# Patient Record
Sex: FEMALE | Race: WHITE | Employment: FULL TIME | URBAN - METROPOLITAN AREA
[De-identification: names, ages, dates, MRNs, and addresses within clinical notes are randomized per-mention and may not be internally consistent; named-entity substitution may affect disease eponyms.]

---

## 2021-06-03 ENCOUNTER — TRANSCRIBE ORDER (OUTPATIENT)
Dept: INTERVENTIONAL RADIOLOGY/VASCULAR | Age: 31
End: 2021-06-03

## 2021-06-03 DIAGNOSIS — D49.9 NEOPLASM: Primary | ICD-10-CM

## 2021-06-10 ENCOUNTER — TRANSCRIBE ORDER (OUTPATIENT)
Dept: INTERVENTIONAL RADIOLOGY/VASCULAR | Age: 31
End: 2021-06-10

## 2021-06-10 NOTE — PROGRESS NOTES
Pt aware to arrive to radiology check in for care unit on Wed Jun 16 @ 0700 for 0830 procedure, pt verbalizes understanding of NPO after MN and needing  home. Pt denies taking antiplatelet/anticoagulant agents.

## 2021-06-16 ENCOUNTER — HOSPITAL ENCOUNTER (OUTPATIENT)
Dept: CT IMAGING | Age: 31
Discharge: HOME | End: 2021-06-16
Attending: RADIOLOGY | Admitting: UROLOGY
Payer: OTHER GOVERNMENT

## 2021-06-16 VITALS
TEMPERATURE: 98 F | BODY MASS INDEX: 24.33 KG/M2 | WEIGHT: 151.4 LBS | OXYGEN SATURATION: 100 % | RESPIRATION RATE: 16 BRPM | HEIGHT: 66 IN | DIASTOLIC BLOOD PRESSURE: 66 MMHG | HEART RATE: 66 BPM | SYSTOLIC BLOOD PRESSURE: 99 MMHG

## 2021-06-16 DIAGNOSIS — D49.9 NEOPLASM: ICD-10-CM

## 2021-06-16 LAB
ANION GAP SERPL CALC-SCNC: 6 MMOL/L (ref 3–18)
APTT PPP: 26.3 SEC (ref 23–36.4)
BUN SERPL-MCNC: 16 MG/DL (ref 7–18)
BUN/CREAT SERPL: 17 (ref 12–20)
CALCIUM SERPL-MCNC: 9 MG/DL (ref 8.5–10.1)
CHLORIDE SERPL-SCNC: 110 MMOL/L (ref 100–111)
CO2 SERPL-SCNC: 26 MMOL/L (ref 21–32)
CREAT SERPL-MCNC: 0.94 MG/DL (ref 0.6–1.3)
ERYTHROCYTE [DISTWIDTH] IN BLOOD BY AUTOMATED COUNT: 11.8 % (ref 11.6–14.5)
GLUCOSE SERPL-MCNC: 93 MG/DL (ref 74–99)
HCG UR QL: NEGATIVE
HCT VFR BLD AUTO: 38.4 % (ref 35–45)
HGB BLD-MCNC: 12.8 G/DL (ref 12–16)
INR PPP: 1.1 (ref 0.8–1.2)
MCH RBC QN AUTO: 29.6 PG (ref 24–34)
MCHC RBC AUTO-ENTMCNC: 33.3 G/DL (ref 31–37)
MCV RBC AUTO: 88.9 FL (ref 74–97)
PLATELET # BLD AUTO: 262 K/UL (ref 135–420)
PMV BLD AUTO: 10 FL (ref 9.2–11.8)
POTASSIUM SERPL-SCNC: 4.1 MMOL/L (ref 3.5–5.5)
PROTHROMBIN TIME: 13.7 SEC (ref 11.5–15.2)
RBC # BLD AUTO: 4.32 M/UL (ref 4.2–5.3)
SODIUM SERPL-SCNC: 142 MMOL/L (ref 136–145)
WBC # BLD AUTO: 4.6 K/UL (ref 4.6–13.2)

## 2021-06-16 PROCEDURE — 80048 BASIC METABOLIC PNL TOTAL CA: CPT

## 2021-06-16 PROCEDURE — 88305 TISSUE EXAM BY PATHOLOGIST: CPT

## 2021-06-16 PROCEDURE — 88333 PATH CONSLTJ SURG CYTO XM 1: CPT

## 2021-06-16 PROCEDURE — 77030022559 CT BX RENAL RT

## 2021-06-16 PROCEDURE — 74011000250 HC RX REV CODE- 250

## 2021-06-16 PROCEDURE — 36415 COLL VENOUS BLD VENIPUNCTURE: CPT

## 2021-06-16 PROCEDURE — 85730 THROMBOPLASTIN TIME PARTIAL: CPT

## 2021-06-16 PROCEDURE — 85610 PROTHROMBIN TIME: CPT

## 2021-06-16 PROCEDURE — 99152 MOD SED SAME PHYS/QHP 5/>YRS: CPT

## 2021-06-16 PROCEDURE — 74011250636 HC RX REV CODE- 250/636: Performed by: RADIOLOGY

## 2021-06-16 PROCEDURE — 85027 COMPLETE CBC AUTOMATED: CPT

## 2021-06-16 PROCEDURE — 81025 URINE PREGNANCY TEST: CPT

## 2021-06-16 RX ORDER — LIDOCAINE HYDROCHLORIDE 10 MG/ML
INJECTION INFILTRATION; PERINEURAL
Status: COMPLETED
Start: 2021-06-16 | End: 2021-06-16

## 2021-06-16 RX ORDER — LIDOCAINE HYDROCHLORIDE 10 MG/ML
1-20 INJECTION INFILTRATION; PERINEURAL
Status: COMPLETED | OUTPATIENT
Start: 2021-06-16 | End: 2021-06-16

## 2021-06-16 RX ORDER — MIDAZOLAM HYDROCHLORIDE 1 MG/ML
.5-4 INJECTION, SOLUTION INTRAMUSCULAR; INTRAVENOUS
Status: DISCONTINUED | OUTPATIENT
Start: 2021-06-16 | End: 2021-06-16 | Stop reason: HOSPADM

## 2021-06-16 RX ORDER — LABETALOL HCL 20 MG/4 ML
5-20 SYRINGE (ML) INTRAVENOUS
Status: DISCONTINUED | OUTPATIENT
Start: 2021-06-16 | End: 2021-06-16 | Stop reason: HOSPADM

## 2021-06-16 RX ORDER — FENTANYL CITRATE 50 UG/ML
25-200 INJECTION, SOLUTION INTRAMUSCULAR; INTRAVENOUS
Status: DISCONTINUED | OUTPATIENT
Start: 2021-06-16 | End: 2021-06-16 | Stop reason: HOSPADM

## 2021-06-16 RX ORDER — HYDRALAZINE HYDROCHLORIDE 20 MG/ML
10-20 INJECTION INTRAMUSCULAR; INTRAVENOUS
Status: DISCONTINUED | OUTPATIENT
Start: 2021-06-16 | End: 2021-06-16 | Stop reason: HOSPADM

## 2021-06-16 RX ORDER — CITALOPRAM 40 MG/1
40 TABLET, FILM COATED ORAL EVERY EVENING
COMMUNITY

## 2021-06-16 RX ORDER — SODIUM CHLORIDE 9 MG/ML
20 INJECTION, SOLUTION INTRAVENOUS CONTINUOUS
Status: DISCONTINUED | OUTPATIENT
Start: 2021-06-16 | End: 2021-06-16 | Stop reason: HOSPADM

## 2021-06-16 RX ORDER — ALBUTEROL SULFATE 90 UG/1
2 AEROSOL, METERED RESPIRATORY (INHALATION)
COMMUNITY

## 2021-06-16 RX ADMIN — SODIUM CHLORIDE 20 ML/HR: 900 INJECTION, SOLUTION INTRAVENOUS at 07:33

## 2021-06-16 RX ADMIN — LIDOCAINE HYDROCHLORIDE 4 ML: 10 INJECTION INFILTRATION; PERINEURAL at 09:05

## 2021-06-16 RX ADMIN — LIDOCAINE HYDROCHLORIDE 4 ML: 10 INJECTION, SOLUTION INFILTRATION; PERINEURAL at 09:05

## 2021-06-16 RX ADMIN — FENTANYL CITRATE 50 MCG: 50 INJECTION, SOLUTION INTRAMUSCULAR; INTRAVENOUS at 09:00

## 2021-06-16 RX ADMIN — MIDAZOLAM HYDROCHLORIDE 1 MG: 1 INJECTION, SOLUTION INTRAMUSCULAR; INTRAVENOUS at 09:00

## 2021-06-16 NOTE — PROGRESS NOTES
Patient identity confirmed using verbal statement and confirmation of armband with name and  identifiers. Confirmed patient has been NPO since midnight, takes no anticoagulants, has had no issues with anesthesia/sedation medications. Pt wishes to proceed with scheduled CT guided right renal mass biopsy. Patient brought to the ct room by this RN and LULA Wyatt RN. Assisted into correct position as confirmed by Sunita Hernandez CT tech. Consent obtained by Dr. Ronald Buchanan and witnessed by this RN. Vitals monitor initiated with EKG monitoring. 2L of O2 via nasal cannula placed on standby, currently on room air. Patient resting comfortably. Will continue to monitor.   Philip Gay RN

## 2021-06-16 NOTE — PROCEDURES
Vascular & Interventional Radiology Brief Procedure Note    Interventional Radiologist: Xochilt Holliday MD    Pre-operative Diagnosis:  R renal mass bx    Post-operative Diagnosis: Same as pre-op dx    Procedure(s) Performed:  same    Anesthesia:  Local and Moderate Sedation    Findings:  Uncomplicated R renal mass bx    Complications: None    Estimated Blood Loss:  minimal    Tubes and Drains: None    Specimens: 6, 18g cores.      Condition: Good       Xochilt Holliday MD  [facility]  Vascular & Interventional Radiology  6/16/2021

## 2021-06-16 NOTE — PROGRESS NOTES
Back from procedure. Band-aid intact to right flank. No bleeding or swelling. C/o of mild soreness at site. Diet given.  at bedside. 1000 Tolerated diet well.  at bedside. 1200 Dr. Rory La called about pt needing to use the restroom. Ok to get up, pt assisted up to bathroom voided, clear yellow urine. Back to bed.   1205 pt currently breast feeding, Dr. Rory La aware, instructed to pump & dump for 24 hrs. Also, pt asked about running. Dr. Rory La instructed her to wait for 48 hrs.   1300 Discharged home via w/c  in care of  in stable condition. Pain 3/10 right flank, soreness.

## 2021-06-16 NOTE — PROGRESS NOTES
TRANSFER - OUT REPORT:    Verbal report given to Armand Vaughan RN (name) on Isaura Ashraf  being transferred to Care Unit (unit) for routine progression of care       Report consisted of patients Situation, Background, Assessment and   Recommendations(SBAR). Information from the following report(s) SBAR, Procedure Summary, MAR and Cardiac Rhythm NSR was reviewed with the receiving nurse. Lines:   Peripheral IV 06/16/21 Anterior;Distal;Right Wrist (Active)   Site Assessment Clean, dry, & intact 06/16/21 0732   Phlebitis Assessment 0 06/16/21 0732   Infiltration Assessment 0 06/16/21 0732   Dressing Status Clean, dry, & intact 06/16/21 0732   Dressing Type Tape;Transparent 06/16/21 0732   Hub Color/Line Status Flushed;Blue 06/16/21 0732   Action Taken Open ports on tubing capped 06/16/21 0732   Alcohol Cap Used Yes 06/16/21 0732        Opportunity for questions and clarification was provided.       Patient transported with:   Registered Nurse     Bandaid to right flank    Imani Szymanski RN

## 2021-06-16 NOTE — DISCHARGE INSTRUCTIONS
Patient Education     DISCHARGE SUMMARY from Nurse    PATIENT INSTRUCTIONS:    After general anesthesia or intravenous sedation, for 24 hours or while taking prescription Narcotics:  · Limit your activities  · Do not drive and operate hazardous machinery  · Do not make important personal or business decisions  · Do  not drink alcoholic beverages  · If you have not urinated within 8 hours after discharge, please contact your surgeon on call. Report the following to your surgeon:  · Excessive pain, swelling, redness or odor of or around the surgical area  · Temperature over 100.5  · Nausea and vomiting lasting longer than 4 hours or if unable to take medications  · Any signs of decreased circulation or nerve impairment to extremity: change in color, persistent  numbness, tingling, coldness or increase pain  · Any questions    What to do at Home:  Recommended activity:     If you experience any of the following symptoms   , please follow up with MD.    *  Please give a list of your current medications to your Primary Care Provider. *  Please update this list whenever your medications are discontinued, doses are      changed, or new medications (including over-the-counter products) are added. *  Please carry medication information at all times in case of emergency situations. These are general instructions for a healthy lifestyle:    No smoking/ No tobacco products/ Avoid exposure to second hand smoke  Surgeon General's Warning:  Quitting smoking now greatly reduces serious risk to your health.     Obesity, smoking, and sedentary lifestyle greatly increases your risk for illness    A healthy diet, regular physical exercise & weight monitoring are important for maintaining a healthy lifestyle    You may be retaining fluid if you have a history of heart failure or if you experience any of the following symptoms:  Weight gain of 3 pounds or more overnight or 5 pounds in a week, increased swelling in our hands or feet or shortness of breath while lying flat in bed. Please call your doctor as soon as you notice any of these symptoms; do not wait until your next office visit. The discharge information has been reviewed with the patient and spouse. The patient and spouse verbalized understanding. Discharge medications reviewed with the patient and spouse and appropriate educational materials and side effects teaching were provided. ___________________________________________________________________________________________________________________________________     Needle Biopsy of the Kidney: What to Expect at Home  Your Recovery     A kidney biopsy is a test to take a sample (biopsy) of kidney. The doctor puts a long needle through your back (flank) into the kidney. Another doctor will look at the kidney tissue with a microscope to check for problems. After the test, you will be told to lie down on your back for several hours. After this, you should avoid strenuous activity for the next 2 to 3 days. It's normal to feel some soreness in the area of the biopsy for 2 to 3 days. You may have a small amount of bleeding on the bandage after the test. You may notice some blood in your urine after the test. This should go away within 12 to 24 hours. If it doesn't, call your doctor. This care sheet gives you a general idea about how long it will take for you to recover. But each person recovers at a different pace. Follow the steps below to feel better as quickly as possible. How can you care for yourself at home? Activity    · Avoid lifting anything that would make you strain. This may include heavy grocery bags and milk containers, a heavy briefcase or backpack, cat litter or dog food bags, a vacuum , or a child.     · Avoid strenuous activities, such as bicycle riding, jogging, weight lifting, or aerobic exercise, until your doctor says it is okay.     · Try to walk each day.  Start by walking a little more than you did the day before. Bit by bit, increase the amount you walk. Walking boosts blood flow and helps prevent pneumonia and constipation.     · Rest when you feel tired. Getting enough sleep will help you recover.     · Ask your doctor when it is okay for you to have sex. Diet    · You can eat your normal diet. If your stomach is upset, try bland, low-fat foods like plain rice, broiled chicken, toast, and yogurt.     · Drink plenty of fluids to avoid becoming dehydrated. Medicines    · Your doctor will tell you if and when you can restart your medicines. He or she will also give you instructions about taking any new medicines.     · If you take aspirin or some other blood thinner, ask your doctor if and when to start taking it again. Make sure that you understand exactly what your doctor wants you to do.     · Do not take aspirin or anti-inflammatory medicines for a week after the biopsy. Incision care    · Keep a bandage over the puncture site for the first 1 or 2 days. Follow-up care is a key part of your treatment and safety. Be sure to make and go to all appointments, and call your doctor if you are having problems. It's also a good idea to know your test results and keep a list of the medicines you take. When should you call for help? Call 911 anytime you think you may need emergency care. For example, call if:    · You passed out (lost consciousness). Call your doctor now or seek immediate medical care if:    · You have signs of infection, such as:  ? Increased pain, swelling, warmth, or redness. ? Red streaks leading from the puncture site. ? Pus draining from the puncture site. ? A fever.     · Bright red blood has soaked through the bandage over the puncture site.     · You have new or worse pain at the puncture site. Watch closely for any changes in your health, and call your doctor if:    · You have blood in your urine for more than 1 day. Where can you learn more?   Go to http://www.gray.[employer]/  Enter [id number] in the search box to learn more about \"Needle Biopsy of the Kidney: What to Expect at Home. \"  Current as of: December 17, 2020               Content Version: 12.8  © 4932-2215 Healthwise, Incorporated. Care instructions adapted under license by [employer] (which disclaims liability or warranty for this information). If you have questions about a medical condition or this instruction, always ask your healthcare professional. [place] [address] any warranty or liability for your use of this information.   Patient armband removed and shredded

## 2021-06-16 NOTE — H&P
The patient is an appropriate candidate to undergo R renal mass biopsy. Patient assessed immediately prior to induction. Anesthesia plan as follows: Moderate Sedation. Planned agent(s):  fentanyl and versed    ASA Score:  ASA 2 - Mild systemic disease    History and Physical update:  H&P was reviewed and the patient was examined. No changes have occurred in the patient's condition since the H&P was completed.     Sav Hubbard MD  Vascular & Interventional Radiology  [facility]  6/16/2021

## 2021-07-06 ENCOUNTER — ANESTHESIA EVENT (OUTPATIENT)
Dept: SURGERY | Age: 31
DRG: 658 | End: 2021-07-06
Payer: OTHER GOVERNMENT

## 2021-07-07 ENCOUNTER — ANESTHESIA (OUTPATIENT)
Dept: SURGERY | Age: 31
DRG: 658 | End: 2021-07-07
Payer: OTHER GOVERNMENT

## 2021-07-07 ENCOUNTER — HOSPITAL ENCOUNTER (INPATIENT)
Age: 31
LOS: 1 days | Discharge: HOME | DRG: 658 | End: 2021-07-08
Attending: UROLOGY | Admitting: UROLOGY
Payer: OTHER GOVERNMENT

## 2021-07-07 DIAGNOSIS — N28.89 RENAL MASS, RIGHT: Primary | ICD-10-CM

## 2021-07-07 LAB
ABO + RH BLD: NORMAL
BLOOD GROUP ANTIBODIES SERPL: NORMAL
HCG UR QL: NEGATIVE
SPECIMEN EXP DATE BLD: NORMAL

## 2021-07-07 PROCEDURE — 74011000250 HC RX REV CODE- 250: Performed by: UROLOGY

## 2021-07-07 PROCEDURE — 2709999900 HC NON-CHARGEABLE SUPPLY: Performed by: UROLOGY

## 2021-07-07 PROCEDURE — 76010000132 HC OR TIME 2.5 TO 3 HR: Performed by: UROLOGY

## 2021-07-07 PROCEDURE — 77030016151 HC PROTCTR LNS DFOG COVD -B: Performed by: UROLOGY

## 2021-07-07 PROCEDURE — 77030002933 HC SUT MCRYL J&J -A: Performed by: UROLOGY

## 2021-07-07 PROCEDURE — 77030002966 HC SUT PDS J&J -A: Performed by: UROLOGY

## 2021-07-07 PROCEDURE — 74011250636 HC RX REV CODE- 250/636: Performed by: UROLOGY

## 2021-07-07 PROCEDURE — 76060000036 HC ANESTHESIA 2.5 TO 3 HR: Performed by: UROLOGY

## 2021-07-07 PROCEDURE — 74011250636 HC RX REV CODE- 250/636: Performed by: ANESTHESIOLOGY

## 2021-07-07 PROCEDURE — 77030008477 HC STYL SATN SLP COVD -A: Performed by: ANESTHESIOLOGY

## 2021-07-07 PROCEDURE — 77030009403 HC ELECTRD ENDO MEGA -B: Performed by: UROLOGY

## 2021-07-07 PROCEDURE — 77030008683 HC TU ET CUF COVD -A: Performed by: ANESTHESIOLOGY

## 2021-07-07 PROCEDURE — 77030018390 HC SPNG HEMSTAT2 J&J -B: Performed by: UROLOGY

## 2021-07-07 PROCEDURE — 77030010935 HC CLP LIG ABSRB TELE -B: Performed by: UROLOGY

## 2021-07-07 PROCEDURE — 81025 URINE PREGNANCY TEST: CPT

## 2021-07-07 PROCEDURE — 77030008574 HC TBNG SUC IRR STRY -B: Performed by: UROLOGY

## 2021-07-07 PROCEDURE — 77030022473 HC HNDL ENDO GIA UNIV USDA -C: Performed by: UROLOGY

## 2021-07-07 PROCEDURE — 77030005515 HC CATH URETH FOL14 BARD -B: Performed by: UROLOGY

## 2021-07-07 PROCEDURE — 77030040361 HC SLV COMPR DVT MDII -B: Performed by: UROLOGY

## 2021-07-07 PROCEDURE — 77030027138 HC INCENT SPIROMETER -A

## 2021-07-07 PROCEDURE — 77030034154 HC SHR COAG HARM ACE J&J -F: Performed by: UROLOGY

## 2021-07-07 PROCEDURE — 74011000258 HC RX REV CODE- 258: Performed by: UROLOGY

## 2021-07-07 PROCEDURE — 77030008462 HC STPLR SKN PROX J&J -A: Performed by: UROLOGY

## 2021-07-07 PROCEDURE — 76210000016 HC OR PH I REC 1 TO 1.5 HR: Performed by: UROLOGY

## 2021-07-07 PROCEDURE — 77030010507 HC ADH SKN DERMBND J&J -B: Performed by: UROLOGY

## 2021-07-07 PROCEDURE — 86901 BLOOD TYPING SEROLOGIC RH(D): CPT

## 2021-07-07 PROCEDURE — C9290 INJ, BUPIVACAINE LIPOSOME: HCPCS | Performed by: UROLOGY

## 2021-07-07 PROCEDURE — 74011250637 HC RX REV CODE- 250/637: Performed by: UROLOGY

## 2021-07-07 PROCEDURE — 77030008603 HC TRCR ENDOSC EPATH J&J -C: Performed by: UROLOGY

## 2021-07-07 PROCEDURE — 65270000029 HC RM PRIVATE

## 2021-07-07 PROCEDURE — 36415 COLL VENOUS BLD VENIPUNCTURE: CPT

## 2021-07-07 PROCEDURE — 74011000250 HC RX REV CODE- 250: Performed by: ANESTHESIOLOGY

## 2021-07-07 PROCEDURE — 77030013079 HC BLNKT BAIR HGGR 3M -A: Performed by: ANESTHESIOLOGY

## 2021-07-07 PROCEDURE — 77030008516 HC TBNG INSUF ENDO S&N -B: Performed by: UROLOGY

## 2021-07-07 PROCEDURE — 88307 TISSUE EXAM BY PATHOLOGIST: CPT

## 2021-07-07 PROCEDURE — 77030009527 HC GEL PRT SYS AMR -E: Performed by: UROLOGY

## 2021-07-07 PROCEDURE — 77030020407 HC IV BLD WRMR ST 3M -A: Performed by: ANESTHESIOLOGY

## 2021-07-07 PROCEDURE — 0TT00ZZ RESECTION OF RIGHT KIDNEY, OPEN APPROACH: ICD-10-PCS | Performed by: UROLOGY

## 2021-07-07 PROCEDURE — 77030022474 HC RELD STPLR ENDO GIA COVD -C: Performed by: UROLOGY

## 2021-07-07 PROCEDURE — 77030020782 HC GWN BAIR PAWS FLX 3M -B: Performed by: UROLOGY

## 2021-07-07 PROCEDURE — 77030006643: Performed by: ANESTHESIOLOGY

## 2021-07-07 PROCEDURE — 77030010031 HC SCIS ENDOSC MPLR J&J -C: Performed by: UROLOGY

## 2021-07-07 DEVICE — CLIP LIG ABSRB HEM-LOK LG PUR --: Type: IMPLANTABLE DEVICE | Site: ABDOMEN | Status: FUNCTIONAL

## 2021-07-07 RX ORDER — HEPARIN SODIUM 5000 [USP'U]/ML
5000 INJECTION, SOLUTION INTRAVENOUS; SUBCUTANEOUS EVERY 12 HOURS
Status: DISCONTINUED | OUTPATIENT
Start: 2021-07-07 | End: 2021-07-08 | Stop reason: HOSPADM

## 2021-07-07 RX ORDER — HYDROMORPHONE HYDROCHLORIDE 1 MG/ML
1 INJECTION, SOLUTION INTRAMUSCULAR; INTRAVENOUS; SUBCUTANEOUS
Status: DISCONTINUED | OUTPATIENT
Start: 2021-07-07 | End: 2021-07-08 | Stop reason: HOSPADM

## 2021-07-07 RX ORDER — ACETAMINOPHEN 325 MG/1
650 TABLET ORAL
Status: DISCONTINUED | OUTPATIENT
Start: 2021-07-07 | End: 2021-07-08 | Stop reason: HOSPADM

## 2021-07-07 RX ORDER — FENTANYL CITRATE 50 UG/ML
25 INJECTION, SOLUTION INTRAMUSCULAR; INTRAVENOUS AS NEEDED
Status: DISCONTINUED | OUTPATIENT
Start: 2021-07-07 | End: 2021-07-07 | Stop reason: HOSPADM

## 2021-07-07 RX ORDER — SODIUM CHLORIDE 0.9 % (FLUSH) 0.9 %
5-40 SYRINGE (ML) INJECTION EVERY 8 HOURS
Status: DISCONTINUED | OUTPATIENT
Start: 2021-07-07 | End: 2021-07-07 | Stop reason: HOSPADM

## 2021-07-07 RX ORDER — MIDAZOLAM HYDROCHLORIDE 1 MG/ML
INJECTION, SOLUTION INTRAMUSCULAR; INTRAVENOUS AS NEEDED
Status: DISCONTINUED | OUTPATIENT
Start: 2021-07-07 | End: 2021-07-07 | Stop reason: HOSPADM

## 2021-07-07 RX ORDER — FENTANYL CITRATE 50 UG/ML
INJECTION, SOLUTION INTRAMUSCULAR; INTRAVENOUS AS NEEDED
Status: DISCONTINUED | OUTPATIENT
Start: 2021-07-07 | End: 2021-07-07 | Stop reason: HOSPADM

## 2021-07-07 RX ORDER — CEFAZOLIN SODIUM/WATER 2 G/20 ML
2 SYRINGE (ML) INTRAVENOUS ONCE
Status: COMPLETED | OUTPATIENT
Start: 2021-07-07 | End: 2021-07-07

## 2021-07-07 RX ORDER — KETOROLAC TROMETHAMINE 15 MG/ML
INJECTION, SOLUTION INTRAMUSCULAR; INTRAVENOUS AS NEEDED
Status: DISCONTINUED | OUTPATIENT
Start: 2021-07-07 | End: 2021-07-07 | Stop reason: HOSPADM

## 2021-07-07 RX ORDER — ONDANSETRON 2 MG/ML
4 INJECTION INTRAMUSCULAR; INTRAVENOUS
Status: DISCONTINUED | OUTPATIENT
Start: 2021-07-07 | End: 2021-07-08 | Stop reason: HOSPADM

## 2021-07-07 RX ORDER — HYDROMORPHONE HYDROCHLORIDE 2 MG/ML
INJECTION, SOLUTION INTRAMUSCULAR; INTRAVENOUS; SUBCUTANEOUS AS NEEDED
Status: DISCONTINUED | OUTPATIENT
Start: 2021-07-07 | End: 2021-07-07 | Stop reason: HOSPADM

## 2021-07-07 RX ORDER — ROCURONIUM BROMIDE 10 MG/ML
INJECTION, SOLUTION INTRAVENOUS AS NEEDED
Status: DISCONTINUED | OUTPATIENT
Start: 2021-07-07 | End: 2021-07-07 | Stop reason: HOSPADM

## 2021-07-07 RX ORDER — GLYCOPYRROLATE 0.2 MG/ML
INJECTION INTRAMUSCULAR; INTRAVENOUS AS NEEDED
Status: DISCONTINUED | OUTPATIENT
Start: 2021-07-07 | End: 2021-07-07 | Stop reason: HOSPADM

## 2021-07-07 RX ORDER — CITALOPRAM 20 MG/1
40 TABLET, FILM COATED ORAL EVERY EVENING
Status: DISCONTINUED | OUTPATIENT
Start: 2021-07-07 | End: 2021-07-08 | Stop reason: HOSPADM

## 2021-07-07 RX ORDER — EPHEDRINE SULFATE/0.9% NACL/PF 50 MG/5 ML
SYRINGE (ML) INTRAVENOUS AS NEEDED
Status: DISCONTINUED | OUTPATIENT
Start: 2021-07-07 | End: 2021-07-07 | Stop reason: HOSPADM

## 2021-07-07 RX ORDER — KETAMINE HYDROCHLORIDE 10 MG/ML
INJECTION, SOLUTION INTRAMUSCULAR; INTRAVENOUS AS NEEDED
Status: DISCONTINUED | OUTPATIENT
Start: 2021-07-07 | End: 2021-07-07 | Stop reason: HOSPADM

## 2021-07-07 RX ORDER — FLUMAZENIL 0.1 MG/ML
0.2 INJECTION INTRAVENOUS
Status: DISCONTINUED | OUTPATIENT
Start: 2021-07-07 | End: 2021-07-07 | Stop reason: HOSPADM

## 2021-07-07 RX ORDER — NALOXONE HYDROCHLORIDE 0.4 MG/ML
0.1 INJECTION, SOLUTION INTRAMUSCULAR; INTRAVENOUS; SUBCUTANEOUS AS NEEDED
Status: DISCONTINUED | OUTPATIENT
Start: 2021-07-07 | End: 2021-07-07 | Stop reason: HOSPADM

## 2021-07-07 RX ORDER — OXYCODONE AND ACETAMINOPHEN 5; 325 MG/1; MG/1
1 TABLET ORAL
Status: DISCONTINUED | OUTPATIENT
Start: 2021-07-07 | End: 2021-07-08 | Stop reason: HOSPADM

## 2021-07-07 RX ORDER — CEFAZOLIN SODIUM/WATER 2 G/20 ML
2 SYRINGE (ML) INTRAVENOUS EVERY 8 HOURS
Status: DISCONTINUED | OUTPATIENT
Start: 2021-07-07 | End: 2021-07-08 | Stop reason: HOSPADM

## 2021-07-07 RX ORDER — DEXAMETHASONE SODIUM PHOSPHATE 4 MG/ML
INJECTION, SOLUTION INTRA-ARTICULAR; INTRALESIONAL; INTRAMUSCULAR; INTRAVENOUS; SOFT TISSUE AS NEEDED
Status: DISCONTINUED | OUTPATIENT
Start: 2021-07-07 | End: 2021-07-07 | Stop reason: HOSPADM

## 2021-07-07 RX ORDER — SODIUM CHLORIDE 0.9 % (FLUSH) 0.9 %
5-40 SYRINGE (ML) INJECTION AS NEEDED
Status: DISCONTINUED | OUTPATIENT
Start: 2021-07-07 | End: 2021-07-07 | Stop reason: HOSPADM

## 2021-07-07 RX ORDER — DEXMEDETOMIDINE HYDROCHLORIDE 100 UG/ML
INJECTION, SOLUTION INTRAVENOUS AS NEEDED
Status: DISCONTINUED | OUTPATIENT
Start: 2021-07-07 | End: 2021-07-07 | Stop reason: HOSPADM

## 2021-07-07 RX ORDER — SODIUM CHLORIDE, SODIUM LACTATE, POTASSIUM CHLORIDE, CALCIUM CHLORIDE 600; 310; 30; 20 MG/100ML; MG/100ML; MG/100ML; MG/100ML
125 INJECTION, SOLUTION INTRAVENOUS CONTINUOUS
Status: DISCONTINUED | OUTPATIENT
Start: 2021-07-07 | End: 2021-07-08 | Stop reason: HOSPADM

## 2021-07-07 RX ORDER — SODIUM CHLORIDE, SODIUM LACTATE, POTASSIUM CHLORIDE, CALCIUM CHLORIDE 600; 310; 30; 20 MG/100ML; MG/100ML; MG/100ML; MG/100ML
50 INJECTION, SOLUTION INTRAVENOUS CONTINUOUS
Status: DISCONTINUED | OUTPATIENT
Start: 2021-07-07 | End: 2021-07-08 | Stop reason: HOSPADM

## 2021-07-07 RX ORDER — DEXTROSE 50 % IN WATER (D50W) INTRAVENOUS SYRINGE
25-50 AS NEEDED
Status: DISCONTINUED | OUTPATIENT
Start: 2021-07-07 | End: 2021-07-07 | Stop reason: HOSPADM

## 2021-07-07 RX ORDER — SODIUM CHLORIDE, SODIUM LACTATE, POTASSIUM CHLORIDE, CALCIUM CHLORIDE 600; 310; 30; 20 MG/100ML; MG/100ML; MG/100ML; MG/100ML
1000 INJECTION, SOLUTION INTRAVENOUS CONTINUOUS
Status: DISCONTINUED | OUTPATIENT
Start: 2021-07-07 | End: 2021-07-07 | Stop reason: HOSPADM

## 2021-07-07 RX ORDER — ACETAMINOPHEN 325 MG/1
650 TABLET ORAL
COMMUNITY

## 2021-07-07 RX ORDER — NEOSTIGMINE METHYLSULFATE 1 MG/ML
INJECTION, SOLUTION INTRAVENOUS AS NEEDED
Status: DISCONTINUED | OUTPATIENT
Start: 2021-07-07 | End: 2021-07-07 | Stop reason: HOSPADM

## 2021-07-07 RX ORDER — ONDANSETRON 2 MG/ML
INJECTION INTRAMUSCULAR; INTRAVENOUS AS NEEDED
Status: DISCONTINUED | OUTPATIENT
Start: 2021-07-07 | End: 2021-07-07 | Stop reason: HOSPADM

## 2021-07-07 RX ORDER — MAGNESIUM SULFATE 100 %
4 CRYSTALS MISCELLANEOUS AS NEEDED
Status: DISCONTINUED | OUTPATIENT
Start: 2021-07-07 | End: 2021-07-07 | Stop reason: HOSPADM

## 2021-07-07 RX ORDER — DEXTROSE MONOHYDRATE AND SODIUM CHLORIDE 5; .45 G/100ML; G/100ML
125 INJECTION, SOLUTION INTRAVENOUS CONTINUOUS
Status: DISCONTINUED | OUTPATIENT
Start: 2021-07-07 | End: 2021-07-08 | Stop reason: HOSPADM

## 2021-07-07 RX ORDER — NALOXONE HYDROCHLORIDE 0.4 MG/ML
0.4 INJECTION, SOLUTION INTRAMUSCULAR; INTRAVENOUS; SUBCUTANEOUS AS NEEDED
Status: DISCONTINUED | OUTPATIENT
Start: 2021-07-07 | End: 2021-07-08 | Stop reason: HOSPADM

## 2021-07-07 RX ORDER — HYDROMORPHONE HYDROCHLORIDE 1 MG/ML
0.5 INJECTION, SOLUTION INTRAMUSCULAR; INTRAVENOUS; SUBCUTANEOUS
Status: DISCONTINUED | OUTPATIENT
Start: 2021-07-07 | End: 2021-07-07 | Stop reason: HOSPADM

## 2021-07-07 RX ORDER — LIDOCAINE HYDROCHLORIDE 20 MG/ML
INJECTION, SOLUTION EPIDURAL; INFILTRATION; INTRACAUDAL; PERINEURAL AS NEEDED
Status: DISCONTINUED | OUTPATIENT
Start: 2021-07-07 | End: 2021-07-07 | Stop reason: HOSPADM

## 2021-07-07 RX ORDER — PROPOFOL 10 MG/ML
INJECTION, EMULSION INTRAVENOUS AS NEEDED
Status: DISCONTINUED | OUTPATIENT
Start: 2021-07-07 | End: 2021-07-07 | Stop reason: HOSPADM

## 2021-07-07 RX ORDER — SODIUM CHLORIDE 0.9 % (FLUSH) 0.9 %
5-40 SYRINGE (ML) INJECTION AS NEEDED
Status: DISCONTINUED | OUTPATIENT
Start: 2021-07-07 | End: 2021-07-08 | Stop reason: HOSPADM

## 2021-07-07 RX ORDER — ALBUTEROL SULFATE 90 UG/1
2 AEROSOL, METERED RESPIRATORY (INHALATION)
Status: DISCONTINUED | OUTPATIENT
Start: 2021-07-07 | End: 2021-07-08 | Stop reason: HOSPADM

## 2021-07-07 RX ORDER — SODIUM CHLORIDE 0.9 % (FLUSH) 0.9 %
5-40 SYRINGE (ML) INJECTION EVERY 8 HOURS
Status: DISCONTINUED | OUTPATIENT
Start: 2021-07-07 | End: 2021-07-08 | Stop reason: HOSPADM

## 2021-07-07 RX ADMIN — DEXMEDETOMIDINE HYDROCHLORIDE 10 MCG: 100 INJECTION, SOLUTION INTRAVENOUS at 09:02

## 2021-07-07 RX ADMIN — LIDOCAINE HYDROCHLORIDE 100 MG: 20 INJECTION, SOLUTION INTRAVENOUS at 07:13

## 2021-07-07 RX ADMIN — OXYCODONE HYDROCHLORIDE AND ACETAMINOPHEN 1 TABLET: 5; 325 TABLET ORAL at 12:43

## 2021-07-07 RX ADMIN — KETOROLAC TROMETHAMINE 15 MG: 15 INJECTION, SOLUTION INTRAMUSCULAR; INTRAVENOUS at 09:24

## 2021-07-07 RX ADMIN — CITALOPRAM HYDROBROMIDE 40 MG: 20 TABLET ORAL at 17:57

## 2021-07-07 RX ADMIN — SODIUM CHLORIDE, POTASSIUM CHLORIDE, SODIUM LACTATE AND CALCIUM CHLORIDE 50 ML/HR: 600; 310; 30; 20 INJECTION, SOLUTION INTRAVENOUS at 06:25

## 2021-07-07 RX ADMIN — SODIUM CHLORIDE, SODIUM LACTATE, POTASSIUM CHLORIDE, AND CALCIUM CHLORIDE: 600; 310; 30; 20 INJECTION, SOLUTION INTRAVENOUS at 08:58

## 2021-07-07 RX ADMIN — MIDAZOLAM 4 MG: 1 INJECTION INTRAMUSCULAR; INTRAVENOUS at 07:06

## 2021-07-07 RX ADMIN — KETAMINE HYDROCHLORIDE 20 MG: 10 INJECTION, SOLUTION INTRAMUSCULAR; INTRAVENOUS at 08:33

## 2021-07-07 RX ADMIN — FENTANYL CITRATE 50 MCG: 50 INJECTION, SOLUTION INTRAMUSCULAR; INTRAVENOUS at 07:38

## 2021-07-07 RX ADMIN — ROCURONIUM BROMIDE 10 MG: 10 INJECTION, SOLUTION INTRAVENOUS at 07:41

## 2021-07-07 RX ADMIN — ONDANSETRON 4 MG: 2 INJECTION INTRAMUSCULAR; INTRAVENOUS at 22:29

## 2021-07-07 RX ADMIN — CEFAZOLIN 2 G: 10 INJECTION, POWDER, FOR SOLUTION INTRAVENOUS at 23:58

## 2021-07-07 RX ADMIN — KETAMINE HYDROCHLORIDE 30 MG: 10 INJECTION, SOLUTION INTRAMUSCULAR; INTRAVENOUS at 07:34

## 2021-07-07 RX ADMIN — DEXTROSE MONOHYDRATE AND SODIUM CHLORIDE 125 ML/HR: 5; .45 INJECTION, SOLUTION INTRAVENOUS at 22:19

## 2021-07-07 RX ADMIN — OXYCODONE HYDROCHLORIDE AND ACETAMINOPHEN 1 TABLET: 5; 325 TABLET ORAL at 22:26

## 2021-07-07 RX ADMIN — HEPARIN SODIUM 5000 UNITS: 5000 INJECTION INTRAVENOUS; SUBCUTANEOUS at 12:44

## 2021-07-07 RX ADMIN — Medication 10 MG: at 07:34

## 2021-07-07 RX ADMIN — ROCURONIUM BROMIDE 5 MG: 10 INJECTION, SOLUTION INTRAVENOUS at 08:47

## 2021-07-07 RX ADMIN — CEFAZOLIN 2 G: 10 INJECTION, POWDER, FOR SOLUTION INTRAVENOUS at 16:13

## 2021-07-07 RX ADMIN — DEXTROSE MONOHYDRATE AND SODIUM CHLORIDE 125 ML/HR: 5; .45 INJECTION, SOLUTION INTRAVENOUS at 12:45

## 2021-07-07 RX ADMIN — DEXTROSE MONOHYDRATE AND SODIUM CHLORIDE 125 ML/HR: 5; .45 INJECTION, SOLUTION INTRAVENOUS at 19:25

## 2021-07-07 RX ADMIN — FENTANYL CITRATE 50 MCG: 50 INJECTION, SOLUTION INTRAMUSCULAR; INTRAVENOUS at 08:33

## 2021-07-07 RX ADMIN — Medication 10 ML: at 22:19

## 2021-07-07 RX ADMIN — ONDANSETRON 4 MG: 2 INJECTION INTRAMUSCULAR; INTRAVENOUS at 16:13

## 2021-07-07 RX ADMIN — FENTANYL CITRATE 100 MCG: 50 INJECTION, SOLUTION INTRAMUSCULAR; INTRAVENOUS at 07:13

## 2021-07-07 RX ADMIN — Medication 5 MG: at 08:38

## 2021-07-07 RX ADMIN — HEPARIN SODIUM 5000 UNITS: 5000 INJECTION INTRAVENOUS; SUBCUTANEOUS at 23:58

## 2021-07-07 RX ADMIN — PROPOFOL 200 MG: 10 INJECTION, EMULSION INTRAVENOUS at 07:13

## 2021-07-07 RX ADMIN — CEFAZOLIN 2 G: 1 INJECTION, POWDER, FOR SOLUTION INTRAVENOUS at 07:23

## 2021-07-07 RX ADMIN — HYDROMORPHONE HYDROCHLORIDE 0.5 MG: 2 INJECTION, SOLUTION INTRAMUSCULAR; INTRAVENOUS; SUBCUTANEOUS at 09:19

## 2021-07-07 RX ADMIN — SODIUM CHLORIDE, SODIUM LACTATE, POTASSIUM CHLORIDE, AND CALCIUM CHLORIDE 125 ML/HR: 600; 310; 30; 20 INJECTION, SOLUTION INTRAVENOUS at 06:06

## 2021-07-07 RX ADMIN — Medication 10 MG: at 07:36

## 2021-07-07 RX ADMIN — HYDROMORPHONE HYDROCHLORIDE 0.5 MG: 1 INJECTION, SOLUTION INTRAMUSCULAR; INTRAVENOUS; SUBCUTANEOUS at 10:35

## 2021-07-07 RX ADMIN — ROCURONIUM BROMIDE 20 MG: 10 INJECTION, SOLUTION INTRAVENOUS at 08:04

## 2021-07-07 RX ADMIN — Medication 3 MG: at 09:19

## 2021-07-07 RX ADMIN — ROCURONIUM BROMIDE 50 MG: 10 INJECTION, SOLUTION INTRAVENOUS at 07:13

## 2021-07-07 RX ADMIN — DEXAMETHASONE SODIUM PHOSPHATE 8 MG: 4 INJECTION, SOLUTION INTRAMUSCULAR; INTRAVENOUS at 08:06

## 2021-07-07 RX ADMIN — ONDANSETRON HYDROCHLORIDE 4 MG: 2 INJECTION INTRAMUSCULAR; INTRAVENOUS at 09:06

## 2021-07-07 RX ADMIN — OXYCODONE HYDROCHLORIDE AND ACETAMINOPHEN 1 TABLET: 5; 325 TABLET ORAL at 17:57

## 2021-07-07 RX ADMIN — GLYCOPYRROLATE 0.4 MG: 0.2 INJECTION INTRAMUSCULAR; INTRAVENOUS at 09:19

## 2021-07-07 NOTE — PROGRESS NOTES
1450-page MD Dunhami for Irineo. 1500-MD Trejo paged. 1926-Bedside and Verbal shift change report given to [address] [place] [address] by Edilma Cantu RN. Report included the following information SBAR, Kardex, OR Summary, Intake/Output and MAR.

## 2021-07-07 NOTE — ANESTHESIA POSTPROCEDURE EVALUATION
Procedure(s):  RIGHT HAND ASSISTED LAPAROSCOPIC RADICAL NEPHRECTOMY. general    Anesthesia Post Evaluation        Comments: Post-Anesthesia Evaluation and Assessment    Cardiovascular Function/Vital Signs  BP (!) 106/55   Pulse 68   Temp 36.7 °C (98 °F)   Resp 11   Ht 5' 6\" (1.676 m)   Wt 68.6 kg (151 lb 3.2 oz)   SpO2 100%   BMI 24.40 kg/m²     Patient is status post Procedure(s):  RIGHT HAND ASSISTED LAPAROSCOPIC RADICAL NEPHRECTOMY. Nausea/Vomiting: Controlled. Postoperative hydration reviewed and adequate. Pain:  Pain Scale 1: FLACC (07/07/21 1112)  Pain Intensity 1: 0 (07/07/21 1112)   Managed. Neurological Status:   Neuro (WDL): Within Defined Limits (07/07/21 1052)   At baseline. Mental Status and Level of Consciousness: Arousable. Pulmonary Status:   O2 Device: None (Room air) (07/07/21 1052)   Adequate oxygenation and airway patent. Complications related to anesthesia: None    Post-anesthesia assessment completed. No concerns. Patient has met all discharge requirements. Signed By: Chasity Conklin MD    July 7, 2021                   INITIAL Post-op Vital signs:   Vitals Value Taken Time   /56 07/07/21 1115   Temp 36.7 °C (98 °F) 07/07/21 1006   Pulse 89 07/07/21 1123   Resp 18 07/07/21 1123   SpO2 100 % 07/07/21 1123   Vitals shown include unvalidated device data.

## 2021-07-07 NOTE — PERIOP NOTES
TRANSFER - IN REPORT:    [facility] [place], RN on [place]  being received from [place] for routine progression of care      Report consisted of patients Situation, Background, Assessment and   Recommendations(SBAR). Information from the following report(s) OR Summary, Procedure Summary, Intake/Output and MAR was reviewed with the receiving nurse. Opportunity for questions and clarification was provided. Assessment completed upon patients arrival to unit and care assumed.

## 2021-07-07 NOTE — PERIOP NOTES
Reviewed PTA medication list with patient/caregiver and patient/caregiver denies any additional medications. Patient admits to having a responsible adult care for them at home for at least 24 hours after surgery. Patient encouraged to use gown warming system and informed that using said warming gown to regulate body temperature prior to a procedure has been shown to help reduce the risks of blood clots and infection. Patient's pharmacy of choice verified and documented in PTA medication section. Dual skin assessment & fall risk band verification completed with Sherice Connors RN. Urine pregnancy results negative and verified with PATEL Claudio RN.

## 2021-07-07 NOTE — H&P
Update History & Physical    The Patient's History and Physical of June 28, 2021 was reviewed with the patient and I examined the patient. There was no change. The surgical site was confirmed by the patient and me. Plan:  The risk, benefits, expected outcome, and alternative to the recommended procedure have been discussed with the patient. Patient understands and wants to proceed with the procedure. Electronically signed by Alicia Gonzáles MD on 7/7/2021 at 7:04 AM      [facility]  [id number]  Tel: [phone]  Fax: [phone]    Patient :  Joan Nazario  YOB: 1990  Birth Sex:  Female  Current Gender:  Female  Date:   06/28/2021 12:45 PM   Visit Type:    Office Visit  Assessment/Plan  #  Detail Type  Description   1. 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 Patient Plan  Discussed the patient recent biopsy results from the right kidney. Shows patient to have a features consistent with a clear cell cancer. I discussed this with the patient and  in detail. Reviewed the surgical approach for removal of the right kidney. She will undergo hand assisted right radical nephrectomy. Risks and benefits discussed in detail and at length. History of gallbladder surgery laparoscopically. Patient understands and wishes to proceed. She wishes to be schedule the soon as possible. Tentatively scheduled for 07/07/2021. All questions were answered. Total time counseling coordinating care 30 minutes              Additional Visit Information      This 26year old female presents for Renal Mass. History of Present Illness  1. Renal Mass   Location of mass: Right mid pole and hilar. A single mass is present. Quality of the mass: solid mass. There are no identifying factors. No treatment has been performed. Pertinent negatives include fever, headache, nausea and vomiting.   Additional information: 05/25/2021-reviewed records from [facility]. CT scan with contrast renal protocol 04/28/2021-right kidney enhancing 3.7 x 3 circumscribed mass increased in size from 2 x 1.7 cm, centered in cortex medial midpole bulging to the hilar area. History of a hysterectomy. Gross hematuria 2 yrs ago. Patient had a renal ultrasound performed 03/2021 showed lesion to be about 3.4 x 3.0 cm. Previously in 2018 renal ultrasound shows the mass to be about 2 cm.    06/01/2021-reviewed patient CT scan with some showing them the images and the findings of the right renal mass which is involving the mid pole of the kidney medially in hilar. We discussed management including radical nephrectomy due to location of the lesion and the fact that it likely if malignant is involving the renal sinus fat. We also discussed consideration for a biopsy of this to determine its benign or malignant nature. They have elected to proceed with a biopsy before making a final decision. 06/28/2021-patient underwent right renal mass biopsy CT-guided 06/16/2021-pathology shows features suspicious for clear cell carcinoma. Medical/Surgical/Interim History  Reviewed, no change. Last detailed document date:06/01/2021. Problem List  Problem List reviewed. Problem Description  Onset Date  Chronic  Clinical Status  Notes  Renal mass  05/31/2021  N        Medications (active prior to today)  Medication  Instructions  Start Date  Stop Date  Refilled  Elsewhere  Celexa 40 mg tablet  take 1 tablet by oral route  every day  05/16/2019 05/16/2019  N  Zyrtec 10 mg tablet  take 1 tablet by oral route  every day  //      Y      Medication Reconciliation  Medications reconciled today.     Medication Reviewed  Adherence  Medication Name  Sig Desc  Elsewhere  Status  taking as directed  Zyrtec 10 mg tablet  take 1 tablet by oral route  every day  Y  Verified  taking as directed  Celexa 40 mg tablet  take 1 tablet by oral route every day  N  Verified    Allergies  Ingredient  Reaction (Severity)  Medication Name  Comment  SULFA (SULFONAMIDE ANTIBIOTICS)          Reviewed, no changes. Family History  Reviewed, no changes. Last detailed document date:06/01/2021. Social History  Reviewed, no changes. Last detailed document date: 06/01/2021. Review of Systems  System  Neg/Pos  Details  Constitutional  Negative  Chills and Fever. ENMT  Negative  Ear infections and Sore throat. Eyes  Negative  Blurred vision, Double vision and Eye pain. Respiratory  Negative  Asthma, Chronic cough, Dyspnea and Wheezing. Cardio  Negative  Chest pain. GI  Negative  Constipation, Decreased appetite, Diarrhea, Nausea and Vomiting. Endocrine  Negative  Cold intolerance, Heat intolerance, Increased thirst and Weight loss. Neuro  Negative  Headache and Tremors. Psych  Negative  Anxiety and Depression. Integumentary  Negative  Itching skin and Rash. MS  Negative  Back pain and Joint pain. Hema/Lymph  Negative  Easy bleeding.     Medications (added, continued, or stopped today)  Start Date  Medication  Directions  PRN Status  PRN Reason  Instruction  Stop Date  05/16/2019  Celexa 40 mg tablet  take 1 tablet by oral route  every day  N          Zyrtec 10 mg tablet  take 1 tablet by oral route  every day  N          Total time of the encounter was 30 minutes, which includes time spent face-to-face with the patient as well as non-face-to-face time personally spent by the physician and/or other qualified health care professional.      Active Patient Care Team Members  Name  Contact  Agency Type  Support Role  Relationship  Active Date  Inactive Date  Specialty  Eri Levy      Patient provider  PCP      Barbara Marte      Emergency Contact  Spouse            Provider:    Fidencio Singh MD 06/28/2021 2:06 PM    Document generated by:  Mariana Zacarias 06/28/2021 02:06 PM

## 2021-07-07 NOTE — OP NOTES
Patient: Christiano Saul  YOB: 1990  MRN: [id number]    Date of Procedure: 2021     Pre-Op Diagnosis: NEOPLASM OF UNSPECIFIED BEHAVIOR RIGHT KIDNEY     Post-Op Diagnosis: Same as preoperative diagnosis. Procedure(s):  RIGHT HAND ASSISTED LAPAROSCOPIC RADICAL NEPHRECTOMY    Surgeon(s):  Marimar De La Torre MD    Surgical Assistant: Surg Asst-1: Shefali Wiggins    Anesthesia: General     Estimated Blood Loss (mL): less than 50     Complications: None    Specimens:   ID Type Source Tests Collected by Time Destination   1 : RIGHT KIDNEY Preservative Kidney, Right  Marimar De La Torre MD 2021 4309 Pathology        Implants:   Implant Name Type Inv. Item Serial No.  Lot No. LRB No. Used Action   CLIP LIG ABSRB HEM-CHRISTOPHER LG PUR --  - [id number]  CLIP LIG ABSRB HEM-CHRISTOPHER LG PUR --   [employer]le[employer] [id number] Right 2 Implanted       Drains: nONE    Findings:  Mass about 3-4cm at the posterior hilum of the kidney. DESCRIPTION OF PROCEDURE:   On the day of the operation, the patient signed the consent form with the risks, benefits, and alternatives that were explained to her. She was then brought to the operative theater and placed on the table in the supine position. Once she was then sufficiently anesthetized with a general anesthetic, a time out was performed and 2 grams of IV cephazolin was given to the patient prior to the procedure. We then made a 7.5 cm oblique incision in the right abdomen. The external oblique fascia was incised and incision carried down through internal oblique fascia. The peritoneum was entered sharply with mets and abdomen was inspected. We verified no inadvertent injury or entry into the abdominal viscera. We then placed 2 12 mm ports in the midline above the umbilicus about 6 cm apart using our hand in the abdomen to protect the abdominal contents. We then placed the GelPort and began insufflations.   We then used the scissors and a asaeler to dissect the colon medially, dissecting the line of Toldt, reflecting it off of the kidney. The duodenum was then kockerized medially. The hepatorenal ligament was free up and a locking lap allis was used to lift up the lower part of the liver from the upper pole of the kidney. Once this was accomplished, we freed up the lower pole, identified the ureter, clicked weck hemolock and divided. The dissection of the kidney was then carried cephalad and the hilum was identified and skeletinized. The hilum was controlled with Endo GAURI 45 vascular stapler. Once this was completed, we cleared the adrenal off of the kidney and used the harmonic scalpel to completely mobilize the kidney and the rest of the lateral and posterior attachments was dissected. The kidney was then removed through our hand-assist port. We then closed all the port sites subcuticularly with 3-0 monocryl. We did take a look in at the renal hilum prior to final removal of the ports, and there was no evidence of any further bleeding. The large incision was closed by approximating by approximating all the fascia in two layers with 1-0 PDS. The patient tolerated the procedure well. She was then extubated and returned back to the supine position where she was transferred to the PACU after being moved to the cart in stable condition.         Electronically Signed by Larisa Santo MD on 7/7/2021 at 10:05 AM

## 2021-07-08 VITALS
TEMPERATURE: 98 F | WEIGHT: 151.2 LBS | HEIGHT: 66 IN | OXYGEN SATURATION: 100 % | HEART RATE: 62 BPM | RESPIRATION RATE: 16 BRPM | SYSTOLIC BLOOD PRESSURE: 109 MMHG | BODY MASS INDEX: 24.3 KG/M2 | DIASTOLIC BLOOD PRESSURE: 70 MMHG

## 2021-07-08 LAB
ANION GAP SERPL CALC-SCNC: 5 MMOL/L (ref 3–18)
BUN SERPL-MCNC: 8 MG/DL (ref 7–18)
BUN/CREAT SERPL: 7 (ref 12–20)
CALCIUM SERPL-MCNC: 8.2 MG/DL (ref 8.5–10.1)
CHLORIDE SERPL-SCNC: 108 MMOL/L (ref 100–111)
CO2 SERPL-SCNC: 27 MMOL/L (ref 21–32)
CREAT SERPL-MCNC: 1.2 MG/DL (ref 0.6–1.3)
ERYTHROCYTE [DISTWIDTH] IN BLOOD BY AUTOMATED COUNT: 11.6 % (ref 11.6–14.5)
GLUCOSE SERPL-MCNC: 117 MG/DL (ref 74–99)
HCT VFR BLD AUTO: 34 % (ref 35–45)
HGB BLD-MCNC: 11 G/DL (ref 12–16)
MCH RBC QN AUTO: 29.1 PG (ref 24–34)
MCHC RBC AUTO-ENTMCNC: 32.4 G/DL (ref 31–37)
MCV RBC AUTO: 89.9 FL (ref 74–97)
PLATELET # BLD AUTO: 271 K/UL (ref 135–420)
PMV BLD AUTO: 10.3 FL (ref 9.2–11.8)
POTASSIUM SERPL-SCNC: 4 MMOL/L (ref 3.5–5.5)
RBC # BLD AUTO: 3.78 M/UL (ref 4.2–5.3)
SODIUM SERPL-SCNC: 140 MMOL/L (ref 136–145)
WBC # BLD AUTO: 10.3 K/UL (ref 4.6–13.2)

## 2021-07-08 PROCEDURE — 74011250636 HC RX REV CODE- 250/636: Performed by: UROLOGY

## 2021-07-08 PROCEDURE — 80048 BASIC METABOLIC PNL TOTAL CA: CPT

## 2021-07-08 PROCEDURE — 74011250637 HC RX REV CODE- 250/637: Performed by: UROLOGY

## 2021-07-08 PROCEDURE — 36415 COLL VENOUS BLD VENIPUNCTURE: CPT

## 2021-07-08 PROCEDURE — 74011000250 HC RX REV CODE- 250: Performed by: UROLOGY

## 2021-07-08 PROCEDURE — 85027 COMPLETE CBC AUTOMATED: CPT

## 2021-07-08 RX ORDER — OXYCODONE AND ACETAMINOPHEN 5; 325 MG/1; MG/1
1 TABLET ORAL
Qty: 20 TABLET | Refills: 0 | Status: SHIPPED | OUTPATIENT
Start: 2021-07-08 | End: 2021-07-15

## 2021-07-08 RX ORDER — DOCUSATE SODIUM 100 MG/1
100 CAPSULE, LIQUID FILLED ORAL 2 TIMES DAILY
Qty: 30 CAPSULE | Refills: 0 | Status: SHIPPED | OUTPATIENT
Start: 2021-07-08 | End: 2021-07-23

## 2021-07-08 RX ORDER — ONDANSETRON 4 MG/1
8 TABLET, ORALLY DISINTEGRATING ORAL
Qty: 20 TABLET | Refills: 0 | Status: SHIPPED | OUTPATIENT
Start: 2021-07-08

## 2021-07-08 RX ADMIN — OXYCODONE HYDROCHLORIDE AND ACETAMINOPHEN 1 TABLET: 5; 325 TABLET ORAL at 14:16

## 2021-07-08 RX ADMIN — HEPARIN SODIUM 5000 UNITS: 5000 INJECTION INTRAVENOUS; SUBCUTANEOUS at 13:42

## 2021-07-08 RX ADMIN — OXYCODONE HYDROCHLORIDE AND ACETAMINOPHEN 1 TABLET: 5; 325 TABLET ORAL at 10:15

## 2021-07-08 RX ADMIN — ONDANSETRON 4 MG: 2 INJECTION INTRAMUSCULAR; INTRAVENOUS at 14:05

## 2021-07-08 RX ADMIN — ACETAMINOPHEN 650 MG: 325 TABLET ORAL at 02:57

## 2021-07-08 RX ADMIN — DEXTROSE MONOHYDRATE AND SODIUM CHLORIDE 125 ML/HR: 5; .45 INJECTION, SOLUTION INTRAVENOUS at 02:57

## 2021-07-08 RX ADMIN — ONDANSETRON 4 MG: 2 INJECTION INTRAMUSCULAR; INTRAVENOUS at 05:02

## 2021-07-08 RX ADMIN — Medication 10 ML: at 05:02

## 2021-07-08 RX ADMIN — CEFAZOLIN 2 G: 10 INJECTION, POWDER, FOR SOLUTION INTRAVENOUS at 07:47

## 2021-07-08 RX ADMIN — OXYCODONE HYDROCHLORIDE AND ACETAMINOPHEN 1 TABLET: 5; 325 TABLET ORAL at 05:02

## 2021-07-08 RX ADMIN — ONDANSETRON 4 MG: 2 INJECTION INTRAMUSCULAR; INTRAVENOUS at 09:43

## 2021-07-08 NOTE — PROGRESS NOTES
1400-Paging MD Christa Story pt wants to go home. 1440-This writer has reviewed discharge instructions with patient at this time. Patient has verbalized understanding. Patient was provided with care notes to include side effects of RX's. AVS reviewed with Carissa Amin RN.    1445-Calling MD office pt said MD said they would send her home with an abx.    1456-Spoke to Ani he said she doesn't need abx, asked him if she could have Zofran for nausea.

## 2021-07-08 NOTE — DISCHARGE INSTRUCTIONS
2 [facility], Urology     [facility], [address], [address] [place] [address], Hilario Harper  Office: [phone]  Fax:    [phone]    PROCEDURE     Right hand assisted radical laparoscopic nephrectomy  __  [facility] IMMEDIATELY if any of the following occur:    Temperature above 101.5° and / or chills.   You are nauseous and / or vomiting and you cannot hold down any fluids.   Your pain is not controlled with the pain medication prescribed. Special Considerations:      Do not drive for at least 2 weeks after the procedure and until you are no longer taking narcotic pain medication and you are able to move and react without hesitation.  You may return to work in 4-6 weeks.  x__  No heavy lifting over 10 pounds & no strenuous exercise for 4-6 weeks    x__  Other instructions:  Ok to shower but no soaking in bath tube for 4 weeks.  _x_  Our office will call you to make an appointment in 7-10 days. Please contact [facility] at [phone] or go to the nearest Emergency Department / Urgent Care facility for any other medical questions or concerns.

## 2021-07-08 NOTE — PROGRESS NOTES
1915 - Assumed care at this time. Pt resting quietly in bed. No signs of distress. Will continue to monitor. Pt encouraged to call for assistance. 1924 - Patient A&Ox4, RA. Denies chest pain and SOB. Denies n/v. Denies flatus/belching. SCD compression device bilaterally. Incision sites to abdomen C/D/I. Denies numbness/tingling/calf pain. Pain 5/10 with a tolerable level of 5/10. Pt educated on IS use, q2h rounds, pain management, CHG wipes and \"Up for Meals\". Pt verbalized understanding, no concerns voiced. Call bell within reach, bed in lowest position. Pt encouraged to call for assistance. 2216 - Patient rated pain 4/10, pain medication administered per MAR. No other concerns voiced at this time. Call bell within reach, bed in lowest position. Pt encouraged to use call bell for any needs. 3358 - Patient rated pain 4/10, pain medication administered per MAR. Pt is belching now. No other concerns voiced at this time. Call bell within reach, bed in lowest position. Pt encouraged to use call bell for any needs. 0501 - Patient rated pain 8/10, pain medication administered per MAR. No other concerns voiced at this time. Call bell within reach, bed in lowest position. Pt encouraged to use call bell for any needs. 5214 - Diallo removed. Per pt she would prefer to do CHG wipes later in the day.

## 2021-07-08 NOTE — ROUTINE PROCESS
Bedside and Verbal shift change report given to KELVIN Alonso RN by Frank Leon RN. Report included the following information SBAR, Kardex, Intake/Output and MAR.

## 2021-07-08 NOTE — PROGRESS NOTES
Urology Progress Note    Patient: Dana Cherry MRN: [id number] [id number]: [id number]    YOB: 1990  Age: 26 y.o. Sex: female    DOA: 2021 LOS:  LOS: 1 day              Subjective:   Patient looks very good this morning she had some mild nausea but used Zofran and that resolved. She is tolerating p.o. and ambulating. She is anxious to go home. .    Objective:      Visit Vitals  /72   Pulse 73   Temp 98 °F (36.7 °C)   Resp 16   Ht 5' 6\" (1.676 m)   Wt 68.6 kg (151 lb 3.2 oz)   SpO2 99%   BMI 24.40 kg/m²     Temp (24hrs), Av.7 °F (36.5 °C), Min:96.2 °F (35.7 °C), Max:98.5 °F (36.9 °C)      Intake and Output:  1901 -  07  In: 5090.6 [P.O.:800; I.V.:4290.6]  Out: 1451 [Urine:3125]  701 -  190  In: -   Out: 100 [Urine:100]    Physical Exam:  Abdomen:  soft, non-tender, non-distended wound clean dry no sign of infection   lab/Data Reviewed:  BMP:   Lab Results   Component Value Date/Time     2021 02:30 AM    K 4.0 2021 02:30 AM     2021 02:30 AM    CO2 27 2021 02:30 AM    AGAP 5 2021 02:30 AM     (H) 2021 02:30 AM    BUN 8 2021 02:30 AM    CREA 1.20 2021 02:30 AM    GFRAA >60 2021 02:30 AM    GFRNA 52 (L) 2021 02:30 AM     CBC:   Lab Results   Component Value Date/Time    WBC 10.3 2021 02:30 AM    HGB 11.0 (L) 2021 02:30 AM    HCT 34.0 (L) 2021 02:30 AM     2021 02:30 AM       Medications Reviewed. Assessment/Plan:   Active Problems:    Renal mass, right (2021)        Status Post:  Procedure(s):  RIGHT HAND ASSISTED LAPAROSCOPIC RADICAL NEPHRECTOMY   Impression: Patient is postop day #1 right radical nephrectomy. She is doing very well and eager to go home. Plan:  1. Patient instructed to ambulate  2. As long as she has no further nausea or vomiting tolerating p.o. well and ambulating she should be able to be discharged this afternoon.     Marycarmen Torres, MD  July 8, 2021

## 2021-07-11 NOTE — DISCHARGE SUMMARY
Discharge Summary     Patient: Debbie Orozco MRN: [id number]  SSN: [id number]    YOB: 1990  Age: 26 y.o. Sex: female      Allergies: Sulfa (sulfonamide antibiotics)    Admit Date: 7/7/2021    Discharge Date: 7/8/2021    * Admission Diagnoses:  Renal mass, right [N28.89]     * Discharge Diagnoses: right renal cell cancer  Hospital Problems as of 7/8/2021 Date Reviewed: 7/7/2021        Codes Class Noted - Resolved POA    Renal mass, right ICD-10-CM: N28.89  ICD-9-CM: 593.9  7/7/2021 - Present Unknown           _x__ The mass/lesion excised was confirmed as  renal cell carcinoma  on pathological study. * Procedures for this admission:   Procedure(s):  RIGHT HAND ASSISTED LAPAROSCOPIC RADICAL NEPHRECTOMY      * Disposition: Home    * Discharged Condition: good    * Hospital Course:  Unremarkable. Pain controlled. Patient tolerated reg diet. She was discharged in good condition. Discharge Medications:   Discharge Medication List as of 7/8/2021  2:31 PM      START taking these medications    Details   oxyCODONE-acetaminophen (PERCOCET) 5-325 mg per tablet Take 1 Tablet by mouth every four (4) hours as needed for Pain for up to 7 days. Max Daily Amount: 6 Tablets., Normal, Disp-20 Tablet, R-0      docusate sodium (Colace) 100 mg capsule Take 1 Capsule by mouth two (2) times a day for 15 days. , Normal, Disp-30 Capsule, R-0         CONTINUE these medications which have NOT CHANGED    Details   acetaminophen (TYLENOL) 325 mg tablet Take 650 mg by mouth every four (4) hours as needed for Pain., Historical Med      citalopram (CeleXA) 40 mg tablet Take 40 mg by mouth every evening., Historical Med      albuterol (PROVENTIL HFA, VENTOLIN HFA, PROAIR HFA) 90 mcg/actuation inhaler Take 2 Puffs by inhalation every four (4) hours as needed for Wheezing., Historical Med              * Follow-up Care/Patient Instructions: Activity: Activity as tolerated  Diet: Regular Diet  Wound Care:  As directed    Follow-up Information     Follow up With Specialties Details Why Ivan Arroyo MD Urology  Dr Carolynn De La Paz office will call you to schedule your follow up appointment.  [facility] [address] [place] [address]  [phone]      None    None ([phone]) Patient stated that they have no PCP

## 2024-08-19 NOTE — ANESTHESIA PREPROCEDURE EVALUATION
Please take your medications as instructed.   Give softball another 2 weeks and see how you feel.   Continue medications as instructed.   Return in one year for a follow up evaluation.       If you were prescribed a medication, please read your medication instructions carefully. Any common side effects or adverse effects may be listed on your prescription when you receive it. If you have any questions or concerns, please do not hesitate to reach our office for further information.     Please call 911 or go to the Emergency Room immediately if you are experiencing any worsening or worrisome symptoms.     Thank you for choosing [facility] for your health care needs.    Janna Recinos PA-C, MMS, DMSc, MPH    Additional Educational Resources:  For additional resources regarding your symptoms, diagnosis, or further health information, please visit the Health Resources section on [employer] or the Education section in your [facility] patient portal.      As a reminder: a heart healthy diet and exercise program (20 mins of walking 5 times a week) is recommended.     For questions about the [facility]istin, call [phone] or email [email or web address]  For questions about your existing [facility] portal, call [phone]       Relevant Problems   No relevant active problems       Anesthetic History   No history of anesthetic complications            Review of Systems / Medical History  Patient summary reviewed, nursing notes reviewed and pertinent labs reviewed    Pulmonary  Within defined limits          Asthma        Neuro/Psych   Within defined limits           Cardiovascular                  Exercise tolerance: >4 METS     GI/Hepatic/Renal  Within defined limits              Endo/Other  Within defined limits           Other Findings              Physical Exam    Airway  Mallampati: I  TM Distance: 4 - 6 cm  Neck ROM: normal range of motion   Mouth opening: Normal     Cardiovascular    Rhythm: regular  Rate: normal         Dental  No notable dental hx       Pulmonary  Breath sounds clear to auscultation               Abdominal  GI exam deferred       Other Findings            Anesthetic Plan    ASA: 2  Anesthesia type: general          Induction: Intravenous  Anesthetic plan and risks discussed with: Patient      Anxious when awakening

## (undated) DEVICE — Device

## (undated) DEVICE — SPONGE LAP 18X18IN STRL -- 5/PK

## (undated) DEVICE — SOLUTION LACTATED RINGERS INJECTION USP

## (undated) DEVICE — TROCAR ENDOSCP L100MM DIA12MM STBL SL BLDELSS ENDOPATH XCEL

## (undated) DEVICE — STERILE POLYISOPRENE POWDER-FREE SURGICAL GLOVES: Brand: PROTEXIS

## (undated) DEVICE — REM POLYHESIVE ADULT PATIENT RETURN ELECTRODE: Brand: VALLEYLAB

## (undated) DEVICE — GARMENT,MEDLINE,DVT,INT,CALF,MED, GEN2: Brand: MEDLINE

## (undated) DEVICE — STRAP,POSITIONING,KNEE/BODY,FOAM,4X60": Brand: MEDLINE

## (undated) DEVICE — LAP CHOLE: Brand: MEDLINE INDUSTRIES, INC.

## (undated) DEVICE — SPONGE HEMOSTAT CELLULS 4X8IN -- SURGICEL

## (undated) DEVICE — ACCESS PLATFORM FOR MINIMALLY INVASIVE SURGERY.: Brand: GELPORT® LAPAROSCOPIC  SYSTEM

## (undated) DEVICE — E-Z CLEAN, PTFE COATED, ELECTROSURGICAL LAPAROSCOPIC ELECTRODE, L-HOOK, 33 CM., SINGLE-USE, FOR USE WITH HAND CONTROL PENCIL: Brand: MEGADYNE

## (undated) DEVICE — ARTICULATION RELOAD WITH TRI-STAPLE TECHNOLOGY: Brand: ENDO GIA

## (undated) DEVICE — TUBING INSUF L10FT HI FLO 400 INSUFFLATOR SYS DISP FOR LAP

## (undated) DEVICE — SCISSORS ENDOSCP DIA5MM CRV MPLR CAUT W/ RATCH HNDL

## (undated) DEVICE — TOWEL,OR,DSP,ST,BLUE,STD,4/PK,20PK/CS: Brand: MEDLINE

## (undated) DEVICE — STRIP,CLOSURE,WOUND,MEDI-STRIP,1/2X4: Brand: MEDLINE

## (undated) DEVICE — SYRINGE, LUER LOCK, 30ML: Brand: MEDLINE

## (undated) DEVICE — SHEARS ENDOSCP L36CM DIA5MM ULTRASONIC CRV TIP W/ ADV

## (undated) DEVICE — UNIVERSAL STAPLER: Brand: ENDO GIA ULTRA

## (undated) DEVICE — SUTURE PDS + SZ 1 L96IN ABSRB VLT L65MM TP-1 1/2 CIR PDP880G

## (undated) DEVICE — CATH TY FOL URN MTR CC 16FRX5 -- LUBRI-SIL

## (undated) DEVICE — INTENDED FOR TISSUE SEPARATION, AND OTHER PROCEDURES THAT REQUIRE A SHARP SURGICAL BLADE TO PUNCTURE OR CUT.: Brand: BARD-PARKER ® DISPOSABLE SCALPELS

## (undated) DEVICE — SUT MONOCRYL PLUS UD 4-0 --

## (undated) DEVICE — GOWN,AURORA,NONRNF,XL,30/CS: Brand: MEDLINE

## (undated) DEVICE — TROCAR LAP L100MM DIA5MM BLDELSS W/ STBL SL ENDOPATH XCEL

## (undated) DEVICE — DISPOSABLE SUCTION/IRRIGATOR TUBE SET WITH TIP: Brand: AHTO

## (undated) DEVICE — DERMABOND SKIN ADH 0.7ML -- DERMABOND ADVANCED 12/BX

## (undated) DEVICE — NEEDLE HYPO 21GA L1.5IN INTRAMUSCULAR S STL LATCH BVL UP

## (undated) DEVICE — VISUALIZATION SYSTEM: Brand: CLEARIFY